# Patient Record
Sex: MALE | Race: WHITE | NOT HISPANIC OR LATINO
[De-identification: names, ages, dates, MRNs, and addresses within clinical notes are randomized per-mention and may not be internally consistent; named-entity substitution may affect disease eponyms.]

---

## 2021-11-24 ENCOUNTER — APPOINTMENT (OUTPATIENT)
Dept: OTOLARYNGOLOGY | Facility: CLINIC | Age: 21
End: 2021-11-24
Payer: OTHER MISCELLANEOUS

## 2021-11-24 VITALS
HEART RATE: 79 BPM | WEIGHT: 157 LBS | HEIGHT: 71 IN | DIASTOLIC BLOOD PRESSURE: 82 MMHG | SYSTOLIC BLOOD PRESSURE: 139 MMHG | BODY MASS INDEX: 21.98 KG/M2

## 2021-11-24 DIAGNOSIS — S09.93XA UNSPECIFIED INJURY OF FACE, INITIAL ENCOUNTER: ICD-10-CM

## 2021-11-24 DIAGNOSIS — S02.2XXA FRACTURE OF NASAL BONES, INITIAL ENCOUNTER FOR CLOSED FRACTURE: ICD-10-CM

## 2021-11-24 PROBLEM — Z00.00 ENCOUNTER FOR PREVENTIVE HEALTH EXAMINATION: Status: ACTIVE | Noted: 2021-11-24

## 2021-11-24 PROCEDURE — 99072 ADDL SUPL MATRL&STAF TM PHE: CPT

## 2021-11-24 PROCEDURE — 99203 OFFICE O/P NEW LOW 30 MIN: CPT

## 2021-11-24 NOTE — ASSESSMENT
[FreeTextEntry1] : NON DISPLACED NASAL BONE FRACTURE AND NASAL BONE SKIN LACERATION IN HEALING PROCESS\par BACITRACIN OVER NASAL SKIN\par LETTER TO RETURN WORK PROVIDED\par F/U PRN\par NASAL BONE NON DISPLACED FRACTURE DISCUSSED\par CONSERVATIVE MANAGEMENT \par F/U PRN

## 2021-11-24 NOTE — REVIEW OF SYSTEMS
[Seasonal Allergies] : seasonal allergies [Negative] : Heme/Lymph [Patient Intake Form Reviewed] : Patient intake form was reviewed

## 2021-11-24 NOTE — HISTORY OF PRESENT ILLNESS
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

## 2021-11-24 NOTE — PHYSICAL EXAM
[de-identified] : S [de-identified] : SCARED NASAL BONE SKIN/ MINIMAL RIGHT DISPLACED NASAL BONE / NON TENDER/ MINIMAL RESOLVING PERIORBITAL ECHYMOSIS [de-identified] : NO SEPTAL HEMATOMA [Normal] : mucosa is normal [Midline] : trachea located in midline position